# Patient Record
Sex: MALE | Employment: UNEMPLOYED | URBAN - METROPOLITAN AREA
[De-identification: names, ages, dates, MRNs, and addresses within clinical notes are randomized per-mention and may not be internally consistent; named-entity substitution may affect disease eponyms.]

---
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
## 2021-08-26 ENCOUNTER — TELEPHONE (OUTPATIENT)
Dept: EMERGENCY MEDICINE | Facility: CLINIC | Age: 21
End: 2021-08-26

## 2021-08-26 NOTE — TELEPHONE ENCOUNTER
"Pt demanding cov result, calling in stating that he needed his covid result as he had it done yesterday. I mentioned to him that his last visit w/ us through  was 2 years ago. I asked several times if he was sure it was done at a [facility] site, he said that it was. Until he checked his papers from that appt which stated \"[facility],\" I mentioned that he needs to contact them directly as we will not be able to assist him. Pt was apologetic and hung up.  "